# Patient Record
Sex: MALE | Race: WHITE | URBAN - METROPOLITAN AREA
[De-identification: names, ages, dates, MRNs, and addresses within clinical notes are randomized per-mention and may not be internally consistent; named-entity substitution may affect disease eponyms.]

---

## 2024-06-21 ENCOUNTER — HOSPITAL ENCOUNTER (OUTPATIENT)
Dept: GENERAL RADIOLOGY | Age: 64
Discharge: HOME | End: 2024-06-21
Payer: COMMERCIAL

## 2024-06-21 ENCOUNTER — HOSPITAL ENCOUNTER (OUTPATIENT)
Age: 64
Discharge: HOME | End: 2024-06-21
Payer: COMMERCIAL

## 2024-06-21 DIAGNOSIS — R52 PAIN: ICD-10-CM

## 2024-06-21 PROCEDURE — 71046 X-RAY EXAM CHEST 2 VIEWS: CPT

## 2025-03-31 ENCOUNTER — OFFICE VISIT (OUTPATIENT)
Age: 65
End: 2025-03-31

## 2025-03-31 VITALS
TEMPERATURE: 98.1 F | OXYGEN SATURATION: 95 % | SYSTOLIC BLOOD PRESSURE: 124 MMHG | HEART RATE: 83 BPM | DIASTOLIC BLOOD PRESSURE: 82 MMHG

## 2025-03-31 DIAGNOSIS — S61.311A LACERATION OF LEFT INDEX FINGER WITHOUT FOREIGN BODY WITH DAMAGE TO NAIL, INITIAL ENCOUNTER: Primary | ICD-10-CM

## 2025-03-31 NOTE — PATIENT INSTRUCTIONS
Bob,    Thank you for trusting [facility] with your health care needs. Your decision to come to us means a lot, and we are honored to be part of your healthcare journey.  At [facility], our dedicated team is committed to providing you with the highest quality of care in a warm and welcoming environment. Your health and well-being are our top priorities, and we appreciate the opportunity to serve you.    Thank you for choosing us, and we’re here for you whenever you need us!    Warm regards,       [facility] Team    [] Dr. Walsh [] IMANI Pizarro, Supervisor       [] FAVIO Jimenez    [] RT Nini    [] IMANI Grant    [] IMANI Daniel   [] IMANI Arellano   [] IMANI Gambino

## 2025-03-31 NOTE — PROGRESS NOTES
Bob Lockhart (: 1960) is a 64 y.o. male, New patient, here for evaluation of the following chief complaint(s):  Finger Laceration (Pt reports left index finger cut happen today. )      ASSESSMENT/PLAN:  No diagnosis found.      Discussed PCP follow up for persisting or worsening symptoms, or to return to the clinic if unable to obtain PCP follow up for worsening symptoms.    The patient tolerated their visit well. The patient and/or the family were informed of the results of any tests, a time was given to answer questions, a plan was proposed and they agreed with plan. Reviewed AVS with treatment instructions and answered questions - pt/family expresses understanding and agreement with the discussed treatment plan and AVS instructions.      SUBJECTIVE/OBJECTIVE:  HPI     Finger Laceration     Additional comments: Pt reports left index finger cut happen today.           Last edited by Marietta Ferguson MA on 3/31/2025  6:38 PM.                VITAL SIGNS  Vitals:    25 1838   BP: 124/82   BP Site: Left Upper Arm   Patient Position: Sitting   BP Cuff Size: Medium Adult   Pulse: 83   Temp: 98.1 °F (36.7 °C)   TempSrc: Oral   SpO2: 95%       Review of Systems  See HPI for pertinent positives and negatives.    Physical Exam  Skin:     Comments: Left index finger with a 1.5 cm laceration over the pad of the digit, impacting the tip of the nail by 1 mm. The wound is not bleeding after removal of several tightly wrapped Bandaids.           PROCEDURES:  Unless otherwise noted below, none     LACERATION REPAIR    Date/Time: 3/31/2025 7:33 PM    Performed by: Masoud Walsh Jr., MD  Authorized by: Masoud Walsh Jr., MD  Body area: upper extremity  Location details: left index finger  Laceration length: 1.5 cm  Foreign bodies: no foreign bodies  Tendon involvement: none  Nerve involvement: none  Vascular damage: no    Sedation:  Patient sedated: no    Preparation: Patient was prepped and draped in the